# Patient Record
Sex: FEMALE | Race: WHITE | Employment: STUDENT | URBAN - METROPOLITAN AREA
[De-identification: names, ages, dates, MRNs, and addresses within clinical notes are randomized per-mention and may not be internally consistent; named-entity substitution may affect disease eponyms.]

---
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
## 2024-06-25 ENCOUNTER — LAB ENCOUNTER (OUTPATIENT)
Dept: LAB | Age: 19
End: 2024-06-25
Attending: FAMILY MEDICINE

## 2024-06-25 ENCOUNTER — OFFICE VISIT (OUTPATIENT)
Dept: FAMILY MEDICINE CLINIC | Facility: CLINIC | Age: 19
End: 2024-06-25

## 2024-06-25 VITALS
TEMPERATURE: 98 F | OXYGEN SATURATION: 98 % | RESPIRATION RATE: 16 BRPM | HEIGHT: 66.5 IN | HEART RATE: 80 BPM | SYSTOLIC BLOOD PRESSURE: 112 MMHG | DIASTOLIC BLOOD PRESSURE: 72 MMHG | BODY MASS INDEX: 29.86 KG/M2 | WEIGHT: 188 LBS

## 2024-06-25 DIAGNOSIS — E66.3 OVERWEIGHT (BMI 25.0-29.9): ICD-10-CM

## 2024-06-25 DIAGNOSIS — Z00.01 ENCOUNTER FOR GENERAL ADULT MEDICAL EXAMINATION WITH ABNORMAL FINDINGS: Primary | ICD-10-CM

## 2024-06-25 DIAGNOSIS — F41.1 GENERALIZED ANXIETY DISORDER: ICD-10-CM

## 2024-06-25 DIAGNOSIS — Z86.19 HISTORY OF MONONUCLEOSIS: ICD-10-CM

## 2024-06-25 DIAGNOSIS — Z00.00 LABORATORY EXAM ORDERED AS PART OF ROUTINE GENERAL MEDICAL EXAMINATION: ICD-10-CM

## 2024-06-25 LAB
ALBUMIN SERPL-MCNC: 3.7 G/DL (ref 3.4–5)
ALBUMIN/GLOB SERPL: 0.8 {RATIO} (ref 1–2)
ALP LIVER SERPL-CCNC: 55 U/L
ALT SERPL-CCNC: 30 U/L
ANION GAP SERPL CALC-SCNC: 9 MMOL/L (ref 0–18)
AST SERPL-CCNC: 18 U/L (ref 15–37)
BASOPHILS # BLD AUTO: 0.1 X10(3) UL (ref 0–0.2)
BASOPHILS NFR BLD AUTO: 1.2 %
BILIRUB SERPL-MCNC: 0.3 MG/DL (ref 0.1–2)
BUN BLD-MCNC: 5 MG/DL (ref 9–23)
CALCIUM BLD-MCNC: 8.9 MG/DL (ref 8.5–10.1)
CHLORIDE SERPL-SCNC: 108 MMOL/L (ref 98–112)
CHOLEST SERPL-MCNC: 228 MG/DL (ref ?–200)
CO2 SERPL-SCNC: 21 MMOL/L (ref 21–32)
CREAT BLD-MCNC: 0.73 MG/DL
EGFRCR SERPLBLD CKD-EPI 2021: 120 ML/MIN/1.73M2 (ref 60–?)
EOSINOPHIL # BLD AUTO: 0.3 X10(3) UL (ref 0–0.7)
EOSINOPHIL NFR BLD AUTO: 3.7 %
ERYTHROCYTE [DISTWIDTH] IN BLOOD BY AUTOMATED COUNT: 13.1 %
FASTING PATIENT LIPID ANSWER: YES
FASTING STATUS PATIENT QL REPORTED: YES
GLOBULIN PLAS-MCNC: 4.7 G/DL (ref 2.8–4.4)
GLUCOSE BLD-MCNC: 80 MG/DL (ref 70–99)
HCT VFR BLD AUTO: 38.2 %
HDLC SERPL-MCNC: 39 MG/DL (ref 40–59)
HGB BLD-MCNC: 12.8 G/DL
IMM GRANULOCYTES # BLD AUTO: 0.03 X10(3) UL (ref 0–1)
IMM GRANULOCYTES NFR BLD: 0.4 %
LDLC SERPL CALC-MCNC: 163 MG/DL (ref ?–100)
LYMPHOCYTES # BLD AUTO: 4.5 X10(3) UL (ref 1–4)
LYMPHOCYTES NFR BLD AUTO: 54.8 %
MCH RBC QN AUTO: 30 PG (ref 26–34)
MCHC RBC AUTO-ENTMCNC: 33.5 G/DL (ref 31–37)
MCV RBC AUTO: 89.5 FL
MONOCYTES # BLD AUTO: 0.57 X10(3) UL (ref 0.1–1)
MONOCYTES NFR BLD AUTO: 6.9 %
NEUTROPHILS # BLD AUTO: 2.71 X10 (3) UL (ref 1.5–7.7)
NEUTROPHILS # BLD AUTO: 2.71 X10(3) UL (ref 1.5–7.7)
NEUTROPHILS NFR BLD AUTO: 33 %
NONHDLC SERPL-MCNC: 189 MG/DL (ref ?–130)
OSMOLALITY SERPL CALC.SUM OF ELEC: 282 MOSM/KG (ref 275–295)
PLATELET # BLD AUTO: 416 10(3)UL (ref 150–450)
POTASSIUM SERPL-SCNC: 3.9 MMOL/L (ref 3.5–5.1)
PROT SERPL-MCNC: 8.4 G/DL (ref 6.4–8.2)
RBC # BLD AUTO: 4.27 X10(6)UL
SODIUM SERPL-SCNC: 138 MMOL/L (ref 136–145)
TRIGL SERPL-MCNC: 142 MG/DL (ref 30–149)
TSI SER-ACNC: 0.99 MIU/ML (ref 0.36–3.74)
VLDLC SERPL CALC-MCNC: 28 MG/DL (ref 0–30)
WBC # BLD AUTO: 8.2 X10(3) UL (ref 4–11)

## 2024-06-25 PROCEDURE — 3078F DIAST BP <80 MM HG: CPT | Performed by: FAMILY MEDICINE

## 2024-06-25 PROCEDURE — 80061 LIPID PANEL: CPT | Performed by: FAMILY MEDICINE

## 2024-06-25 PROCEDURE — 86665 EPSTEIN-BARR CAPSID VCA: CPT | Performed by: FAMILY MEDICINE

## 2024-06-25 PROCEDURE — 86664 EPSTEIN-BARR NUCLEAR ANTIGEN: CPT | Performed by: FAMILY MEDICINE

## 2024-06-25 PROCEDURE — 99203 OFFICE O/P NEW LOW 30 MIN: CPT | Performed by: FAMILY MEDICINE

## 2024-06-25 PROCEDURE — 3008F BODY MASS INDEX DOCD: CPT | Performed by: FAMILY MEDICINE

## 2024-06-25 PROCEDURE — 99385 PREV VISIT NEW AGE 18-39: CPT | Performed by: FAMILY MEDICINE

## 2024-06-25 PROCEDURE — 80050 GENERAL HEALTH PANEL: CPT | Performed by: FAMILY MEDICINE

## 2024-06-25 PROCEDURE — 3074F SYST BP LT 130 MM HG: CPT | Performed by: FAMILY MEDICINE

## 2024-06-25 RX ORDER — BUPROPION HYDROCHLORIDE 150 MG/1
150 TABLET ORAL DAILY
Qty: 30 TABLET | Refills: 1 | Status: SHIPPED | OUTPATIENT
Start: 2024-06-25

## 2024-06-25 NOTE — PROGRESS NOTES
Family Medicine Progress Note  Assessment & Plan:   Jennyfer Rodgers is a 19 year old female who is here for:     1. Encounter for general adult medical examination with abnormal findings  Wellness Exam done today and routine Preventative Care discussed as noted below.   -Pap smear: - plans to do with GYN   -Contraception:  OCP  -Immunizations:  look into HPV  -Routine labs ordered.   -Healthy eating habits and regular exercise encouraged   - CBC With Differential With Platelet; Future  - Comp Metabolic Panel (14); Future  - TSH W Reflex To Free T4; Future  - Lipid Panel; Future    2. Generalized anxiety disorder- CHRONIC issue that has not been actively managed but pt is at a point where she wants to manage this.  No si.hi.   - Discussed TX options- Counseling +/- RX therapy   - Rx for Wellbutrin--- more of an overwhelm pictute--- did warn this is off label and may be stimulating.    - Discussed onset for 6 weeks and common adverse effects (GI upset, decreased sexual drive)  - TIMI ref.  - Recommend adequate sleep, nutrition, and physical activity.    - Remove possible triggers of anxiety/panic if possible   - Return to clinic if develop worsening depression/anxiety, SI/HI, and intolerable GI or sexual side-effects. For severely worsening symptoms, patient is to report to nearest ER and/or seek immediate medical attention.   - Patient verbalized understanding and agreement with the plan.   - buPROPion  MG Oral Tablet 24 Hr; Take 1 tablet (150 mg total) by mouth daily.  Dispense: 30 tablet; Refill: 1  -  NAVIGATOR    3. History of mononucleosis-  recent mono and multiple other conditions.  Would like to check for IgM and when she is less contagious/safe of Dental Proc.   - EBV, Chronic/Active Infection [E]; Future    4. Overweight (BMI 25.0-29.9)  - Discussed BMI, routine labs ordered,  Brief discussion on healthy diet and importance of physical activity.               Follow-Up: Return in about 6 weeks  (around 2024) for F/U Mental Health.      Sabiha Madrigal, DO   24      CC: Establish Care    Subjective:    History of Present Illness:  History obtained from patient.     Jennyfer Rodgers is a 19 year old female who presents for Establish Care     ANNUAL PHYSICAL  Menses: Regular without any break through bleeding or concerning symptoms.   LMP: No LMP recorded. (Menstrual status: Continuous Pill).  Concern for STI: Denies   Contraception:  continuous OCP  Cervical Cancer Screening- Due, has GYN and plan to complete with her    Exercise: generally tries to be active  Healthy eating habits:  Well-rounded  Tobacco: denies   Immunizations: Not sure if she got HPV, will look into.     VERONICA/MDD-  Prev was on Sertraline for Depression, feels like she is in good place with her depression, but moreso struggling with Anxiety- stopped about 1-2 yrs.   Anxiety was worse with being in school, less of an issue with being out.   Doesn't like crowded placed, being around a lot of people.   No recent panic.    Counseling- in HS but not currently.   Trauma- none.   Working now---Nanny--- 18m twins; 3yom-- two diff families.    Sexually active- monogmaous relat.   No pior pap.  Does have GYN which she follows.   Diet- healthy   Exercise- occ walks, too hot to walk outside     -Nausea and Emesis and then nausea x 2d  -UTI- AbX gave yeast infection  -Mono  - back to normal    Inquiring why she keeps getting sick,     Pshx: none   All: seasonal   Fam hx:VERONICA-M, MGM, Heart/CVA-PGF;Ca-MGM    Soc hx: Sexually active- monog.  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 (Menstrual status: Continuous Pill).. last pap: -, last mammogram: -,  ,   Colonoscopy: -     EEH AMB VERONICA-7      Feeling nervous, anxious, or on edge 2     Not being able to stop or control worrying 2     Worrying too much about different things  2     Trouble relaxing 1     Being so restless that it's hard to sit  still 0     Becoming easily annoyed or irritable 1     Feeling afraid as if something awful might happen 2     VERONICA 7 Total Score 10     How difficult has it made it for you to do your work, take care of things at home, get along with others? N         Depression Screenings (All PHQ) 6/25     Little interest or pleasure in doing things 0     Feeling down, depressed, or hopeless 0     Trouble falling/staying asleep, or sleeping too much 1     Feeling tired or having little energy 2     Poor appetite or overeating 2     Feeling bad about yourself 1     Trouble concentrating  0     Moving or speaking slowly Or  fidgety/restless  0     Th you would be better off dead/harming yourself 0     PHQ-9 TOTAL SCORE 6     How difficult has it made it for you to do your work, take care of things at home, get along with others? n         History/Other:   ROS-Per HPI     Problem List:  Patient Active Problem List   Diagnosis    Social anxiety disorder    Generalized anxiety disorder       Current Medications:  Current Outpatient Medications   Medication Sig Dispense Refill    buPROPion  MG Oral Tablet 24 Hr Take 1 tablet (150 mg total) by mouth daily. 30 tablet 1    NORTREL 1/35, 28, 1-35 MG-MCG Oral Tab TK 1 T PO QD ACTIVE PILLS CONTINUOUSLY. SKIP PLACEBO EACH MONTH.        Past Medical History:  Past Medical History:    Allergic rhinitis    Anxiety    Depression    i have gone through depression but am not currently depressed      Past Surgical History:  History reviewed. No pertinent surgical history.   Family History:  Family History   Problem Relation Age of Onset    No Known Problems Father     Anxiety Mother     Depression Mother     Anxiety Maternal Grandmother     Depression Maternal Grandmother     Cancer Maternal Grandfather     Thyroid disease Paternal Grandmother     Heart Disease Paternal Grandfather     Stroke Paternal Grandfather     No Known Problems Sister     Stroke Sister     High Blood Pressure Maternal  Uncle       Social History:  Social History     Socioeconomic History    Marital status: Single   Tobacco Use    Smoking status: Never    Smokeless tobacco: Never   Vaping Use    Vaping status: Never Used   Substance and Sexual Activity    Alcohol use: Not Currently     Comment: 2x/month, 3-5 drinks of vodka per day.    Drug use: Yes     Frequency: 5.0 times per week     Types: Cannabis     Comment: 1-2x week, usually with friends, inhalation    Sexual activity: Never   Other Topics Concern    Caffeine Concern No    Exercise No    Seat Belt Yes    Special Diet No    Stress Concern Yes    Weight Concern Yes       Allergies:  Allergies   Allergen Reactions    Seasonal Coughing        Objective:    VITALS: /72   Pulse 80   Temp 97.8 °F (36.6 °C) (Temporal)   Resp 16   Ht 5' 6.5\" (1.689 m)   Wt 188 lb (85.3 kg)   SpO2 98%   BMI 29.89 kg/m²      BP Readings from Last 3 Encounters:   06/25/24 112/72   08/02/21 116/83   07/30/20 116/82 (72%, Z = 0.58 /  96%, Z = 1.75)*     *BP percentiles are based on the 2017 AAP Clinical Practice Guideline for girls     Wt Readings from Last 3 Encounters:   06/25/24 188 lb (85.3 kg)   08/02/21 215 lb (97.5 kg) (98%, Z= 2.15)*   07/30/20 164 lb 9.6 oz (74.7 kg) (92%, Z= 1.42)*     * Growth percentiles are based on [facility] (Girls, 2-20 Years) data.     PHYSICAL EXAM  GEN: pleasant, well-appearing in NAD, AOX3  SKIN: no visible rashes, lesions, or evidence of trauma  HEENT: PERRL, EOMI, moist mucous membranes, oropharynx clear, TM clear, nares patent, no Thyromegaly or nodule.   CV: RRR, no murmurs or abnl heart sounds   PULM: Clear to auscultation, No wheezes, rales, rhonchi.  Non-labored breathing.  ABD: Soft, non-tender, non-distended, + BS, no rigidity/guarding  EXT:  Warm, well perfused, no lower extremity edema  NEURO: CNs grossly intact, no focal weakness  MSK: moves all 4 extremities without difficulty  PSYCH: mood and affect are appropriate                Sabiha Madrigal  DO    NOTE TO PATIENT: The 21st Century Cures Act makes clinical notes like these available to patients in the interest of transparency. Clinical notes are medical documents used by physicians and care providers to communicate with each other. These documents include medical language and terminology, abbreviations, and treatment information that may sound technical and at times possibly unfamiliar. In addition, at times, the verbiage may appear blunt or direct. These documents are one tool providers use to communicate relevant information and clinical opinions of the care providers in a way that allows common understanding of the clinical context.

## 2024-06-25 NOTE — PATIENT INSTRUCTIONS
.  Treating Anxiety Disorders with Therapy  If you have an anxiety disorder, you should know it can be treated. Therapy (also called counseling) is often a helpful treatment for anxiety disorders. With therapy, a trained therapist helps you face and learn to manage your anxiety. Therapy can be short-term or long-term. It is based on your needs. In some cases, medicine may also be prescribed with therapy. It may take time before you notice how much therapy is helping. But stick with it. With therapy, you can feel better.  Cognitive behavioral therapy (CBT)  Cognitive behavioral therapy (CBT) teaches you to manage anxiety. It does this by helping you understand how you think and act when you’re anxious. Research has shown CBT to work very well for anxiety disorders. CBT includes homework and activities. These build your skills to cope with anxiety step by step. CBT can be done in a group or one-on-one. It often takes place for a set number of sessions. CBT has two main parts:  Cognitive therapy. This helps you identify the negative, irrational thoughts that occur with your anxiety. You’ll learn to replace these with more positive, realistic thoughts.  Behavioral therapy. This helps you change how you react to anxiety. You’ll learn coping skills and methods for relaxing to help you better deal with anxiety.    MENTAL HEALTH APPS-   [employer] CBT- Free Anxiety Focused--each you how to harness CBT to lower anxiety levels. It allows you to challenge the personal beliefs that may be holding you back from more peaceful living, and gives you clear tools to improve your overall wellbeing.    MoodKit $5.00- teaches concepts of Cognitive Behavioral Therapy    CBT Thought Diary- record and identify maladaptive thoughts.     HeadSpace- Great for Meditation can alleviate heightened anxiety, and Headspace aims to take the guesswork out of learning how to meditate, and will help you with a regular meditation practice.    Happify-  Happify is geared toward helping people lower stress and manage anxiety--all by helping you let go of habits that aren’t working for you and creating new ones

## 2024-06-26 LAB
EBV NA IGG SER QL IA: NEGATIVE
EBV VCA IGG SER QL IA: POSITIVE
EBV VCA IGM SER QL IA: POSITIVE

## 2024-06-27 ENCOUNTER — TELEPHONE (OUTPATIENT)
Age: 19
End: 2024-06-27

## 2024-06-27 PROBLEM — R52 PAIN, UNSPECIFIED: Status: RESOLVED | Noted: 2021-03-08 | Resolved: 2024-06-27

## 2024-06-27 PROBLEM — F33.1 MODERATE EPISODE OF RECURRENT MAJOR DEPRESSIVE DISORDER (HCC): Status: RESOLVED | Noted: 2021-03-08 | Resolved: 2024-06-27

## 2024-06-27 PROBLEM — R10.33 PERIUMBILICAL ABDOMINAL PAIN: Status: RESOLVED | Noted: 2020-03-10 | Resolved: 2024-06-27

## 2024-06-27 PROBLEM — R11.2 NON-INTRACTABLE VOMITING WITH NAUSEA, UNSPECIFIED VOMITING TYPE: Status: RESOLVED | Noted: 2019-08-21 | Resolved: 2024-06-27

## 2024-06-27 PROBLEM — R55 SYNCOPE, NEAR: Status: RESOLVED | Noted: 2018-05-07 | Resolved: 2024-06-27

## 2024-06-27 PROBLEM — R63.5 WEIGHT GAIN: Status: RESOLVED | Noted: 2020-12-29 | Resolved: 2024-06-27

## 2024-06-27 PROBLEM — B34.9 VIRAL SYNDROME: Status: RESOLVED | Noted: 2020-02-26 | Resolved: 2024-06-27

## 2024-06-27 PROBLEM — E78.00 PURE HYPERCHOLESTEROLEMIA: Status: ACTIVE | Noted: 2024-06-27

## 2024-07-01 ENCOUNTER — PATIENT MESSAGE (OUTPATIENT)
Dept: FAMILY MEDICINE CLINIC | Facility: CLINIC | Age: 19
End: 2024-07-01

## 2024-07-02 NOTE — TELEPHONE ENCOUNTER
From: Jennyfer Rodgers  To: Sabiha Madrigal  Sent: 7/1/2024 3:55 PM CDT  Subject: abdominal pain    for the past few days i’ve had dull pain on and off on my lower left side and it’s starting to worry me and i just want to check to see if it’s something i should be concerned about

## 2024-08-06 ENCOUNTER — OFFICE VISIT (OUTPATIENT)
Dept: FAMILY MEDICINE CLINIC | Facility: CLINIC | Age: 19
End: 2024-08-06
Payer: COMMERCIAL

## 2024-08-06 VITALS
HEIGHT: 66 IN | OXYGEN SATURATION: 99 % | DIASTOLIC BLOOD PRESSURE: 86 MMHG | SYSTOLIC BLOOD PRESSURE: 110 MMHG | BODY MASS INDEX: 28.45 KG/M2 | RESPIRATION RATE: 14 BRPM | HEART RATE: 90 BPM | WEIGHT: 177 LBS | TEMPERATURE: 98 F

## 2024-08-06 DIAGNOSIS — R10.84 GENERALIZED ABDOMINAL PAIN: ICD-10-CM

## 2024-08-06 DIAGNOSIS — R10.2 PELVIC PAIN: Primary | ICD-10-CM

## 2024-08-06 DIAGNOSIS — Z11.3 SCREEN FOR SEXUALLY TRANSMITTED DISEASES: ICD-10-CM

## 2024-08-06 DIAGNOSIS — F41.1 GENERALIZED ANXIETY DISORDER: ICD-10-CM

## 2024-08-06 DIAGNOSIS — Z12.4 ENCOUNTER FOR SCREENING FOR CERVICAL CANCER: ICD-10-CM

## 2024-08-06 PROCEDURE — 87591 N.GONORRHOEAE DNA AMP PROB: CPT | Performed by: FAMILY MEDICINE

## 2024-08-06 PROCEDURE — 81514 NFCT DS BV&VAGINITIS DNA ALG: CPT | Performed by: FAMILY MEDICINE

## 2024-08-06 PROCEDURE — 99215 OFFICE O/P EST HI 40 MIN: CPT | Performed by: FAMILY MEDICINE

## 2024-08-06 PROCEDURE — 3008F BODY MASS INDEX DOCD: CPT | Performed by: FAMILY MEDICINE

## 2024-08-06 PROCEDURE — 3079F DIAST BP 80-89 MM HG: CPT | Performed by: FAMILY MEDICINE

## 2024-08-06 PROCEDURE — 87491 CHLMYD TRACH DNA AMP PROBE: CPT | Performed by: FAMILY MEDICINE

## 2024-08-06 PROCEDURE — 3074F SYST BP LT 130 MM HG: CPT | Performed by: FAMILY MEDICINE

## 2024-08-06 RX ORDER — BUPROPION HYDROCHLORIDE 150 MG/1
150 TABLET ORAL DAILY
Qty: 90 TABLET | Refills: 0 | Status: SHIPPED | OUTPATIENT
Start: 2024-08-06

## 2024-08-06 RX ORDER — PANTOPRAZOLE SODIUM 20 MG/1
20 TABLET, DELAYED RELEASE ORAL
Qty: 30 TABLET | Refills: 1 | Status: SHIPPED | OUTPATIENT
Start: 2024-08-06

## 2024-08-06 NOTE — PROGRESS NOTES
Family Medicine Progress Note  Assessment & Plan:   Jennyfer Rodgers is a 19 year old female who is here for:     1. Pelvic pain- lower abd pain, which seems more pelvic in nature as it was reproduced on exam.   - Vaginitis panel.   - TVUS.   - Monitor for worsening/improving symptoms.    - Vaginitis Vaginosis PCR Panel; Future  - US PELVIS EV W DOPPLER (CPT=76856/95960/21237); Future  - Vaginitis Vaginosis PCR Panel    2. Generalized abdominal pain- chronic, nonspeicfic.  Bowels seems to alternate bw loose vs constiaption.  Also with nausea and pain in the AM -- curious if this is more acidic/reflux driven.   - Increase exercise and water intake(2L/day);  Goal fiber 25-35g/day  - probiotics,   - Triial of PPI to see if this offers improvement.     3. Encounter for screening for cervical cancer  - ThinPrep Pap with HPV Reflex, Chlamydia/GC; Future  - ThinPrep Pap with HPV Reflex, Chlamydia/GC    4. Screen for sexually transmitted diseases  - Chlamydia/Gc Amplification; Future  - Chlamydia/Gc Amplification    5. Generalized anxiety disorder  Patient describes mood as Euthymic.  Meds controlling symptoms based on GAD7.   -Contin Wellbutrin 150mg   -CBT/Counseling: none --- TIMI ref in the past.    -Aware of importance of adequate sleep, nutrition, and physical activity.   -Aware of ER Precautions for SI/HI.  Ant to Return to clinic if develop worsening symptoms or adverse effects from RX therapy.    - buPROPion  MG Oral Tablet 24 Hr; Take 1 tablet (150 mg total) by mouth daily.  Dispense: 90 tablet; Refill: 0     Follow-Up: Return for pending work-up for GYN and  for Mental health .      Sabiha Madrigal DO   08/06/24      CC: Medication Follow-Up (Wellbutrin and discuss chronic stomach issues)    Subjective:    History of Present Illness:  History obtained from patient.     Jennyfer Rodgers is a 19 year old female who presents for Medication Follow-Up (Wellbutrin and discuss chronic stomach issues)        VERONICA/MDD-  Prev was on Sertraline for Depression, feels like she is in good place with her depression, but moreso struggling with Anxiety- stopped about 1-2 yrs.   Anxiety was worse with being in school, less of an issue with being out.  Doesn't like crowded placed, being around a lot of people.  No recent panic.    Counseling- in HS but not currently.   Trauma- none.   Working now------ 18m twins; 3yom-- two diff families.    Sexually active- monogmaous relat.   Diet- healthy   Exercise- occ walks, too hot to walk outside   24- Has been on Butrans for about 6 weeks, notable improvement in her mood and ability to focus.  May be with some initial irritability but that had resolved after couple weeks.  Feels overall she is doing good from a mental health standpoint.  Would like to continue on this current dose.    ABD- Stomach is sensitive to foods;  Will wake up and feel nauseous and then will last along the day,   BM- usually multiple times per day; but then sometimes will have constipation.  Generally thought she may have IBS. No blood   Lower ABD dull ache that switches sides.  Couple weeks, not all day perse or everyday, but the pain does keep recurring.   Nothing necessarily worsens.  Nothing makes it better.   Continuous OCP so no menses.   No concern for sti.   No vaginal discharge, no dyspareunia.    Does get worse with constipation.   No pain with urination.    No prior GI---      Pshx: none   All: seasonal   Fam hx:VERONICA-M, MGM, Heart/CVA-PGF;Ca-MGM    Soc hx: Sexually active- monog.   2 families Twins and thenn tabby   Ob/gyne: No LMP recorded. (Menstrual status: Continuous Pill).. last pap: -, last mammogram: -,  ,   Colonoscopy: -     EEH AMB VERONICA-7  8    Feeling nervous, anxious, or on edge 2 1    Not being able to stop or control worrying 2 0    Worrying too much about different things  2 1    Trouble relaxing 1 0    Being so restless that it's hard to sit still 0 0     Becoming easily annoyed or irritable 1 0    Feeling afraid as if something awful might happen 2 0    VERONICA 7 Total Score 10 02    How difficult has it made it for you to do your work, take care of things at home, get along with others? N N        Depression Screenings (All PHQ) 6/25 8/6    Little interest or pleasure in doing things 0 0    Feeling down, depressed, or hopeless 0 0    Trouble falling/staying asleep, or sleeping too much 1 0    Feeling tired or having little energy 2 0    Poor appetite or overeating 2 0    Feeling bad about yourself 1 0    Trouble concentrating  0 0    Moving or speaking slowly Or  fidgety/restless  0 0    Th you would be better off dead/harming yourself 0 0    PHQ-9 TOTAL SCORE 6 0    How difficult has it made it for you to do your work, take care of things at home, get along with others? n         History/Other:   ROS-Per HPI     Problem List:  Patient Active Problem List   Diagnosis    Social anxiety disorder    Generalized anxiety disorder    Pure hypercholesterolemia       Current Medications:  Current Outpatient Medications   Medication Sig Dispense Refill    pantoprazole 20 MG Oral Tab EC Take 1 tablet (20 mg total) by mouth every morning before breakfast. 30 tablet 1    buPROPion  MG Oral Tablet 24 Hr Take 1 tablet (150 mg total) by mouth daily. 90 tablet 0    NORTREL 1/35, 28, 1-35 MG-MCG Oral Tab TK 1 T PO QD ACTIVE PILLS CONTINUOUSLY. SKIP PLACEBO EACH MONTH.        Past Medical History:  Past Medical History:    Allergic rhinitis    Anxiety    Depression    i have gone through depression but am not currently depressed      Past Surgical History:  History reviewed. No pertinent surgical history.   Family History:  Family History   Problem Relation Age of Onset    No Known Problems Father     Anxiety Mother     Depression Mother     Anxiety Maternal Grandmother     Depression Maternal Grandmother     Cancer Maternal Grandfather     Thyroid disease Paternal Grandmother      Heart Disease Paternal Grandfather     Stroke Paternal Grandfather     No Known Problems Sister     Stroke Sister     High Blood Pressure Maternal Uncle       Social History:  Social History     Socioeconomic History    Marital status: Single   Tobacco Use    Smoking status: Never    Smokeless tobacco: Never   Vaping Use    Vaping status: Never Used   Substance and Sexual Activity    Alcohol use: Not Currently     Comment: 2x/month, 3-5 drinks of vodka per day.    Drug use: Yes     Frequency: 5.0 times per week     Types: Cannabis     Comment: 1-2x week, usually with friends, inhalation    Sexual activity: Never   Other Topics Concern    Caffeine Concern No    Exercise No    Seat Belt Yes    Special Diet No    Stress Concern Yes    Weight Concern Yes       Allergies:  Allergies   Allergen Reactions    Seasonal Coughing        Objective:    VITALS: /86   Pulse 90   Temp 97.5 °F (36.4 °C) (Temporal)   Resp 14   Ht 5' 6\" (1.676 m)   Wt 177 lb (80.3 kg)   SpO2 99%   BMI 28.57 kg/m²      BP Readings from Last 3 Encounters:   08/06/24 110/86   06/25/24 112/72   08/02/21 116/83     Wt Readings from Last 3 Encounters:   08/06/24 177 lb (80.3 kg)   06/25/24 188 lb (85.3 kg)   08/02/21 215 lb (97.5 kg) (98%, Z= 2.15)*     * Growth percentiles are based on CDC (Girls, 2-20 Years) data.     PHYSICAL EXAM  GEN: pleasant, well-appearing in NAD  SKIN: no visible rashes, lesions, or evidence of trauma  HEENT:  no conjunctivitis or scleral icterus; mmm  PULM: breathing comfortably on room air  MSK: moving all extremities well, no weakness or joint tenderness  NEURO: CNs grossly intact, no focal weakness, alert and oriented   ABD:  Soft, no notable tenderness, achey in the lower quad, not guarding or rigidity.     PELVIC EXAM: Chaperone offered and declined.   Vulva: no masses, tenderness or lesions,   Vagina: normal mucosa and rugae, no lesions  Cervix: thick discharge, more friable with obtaining sample.   Uterus:  normal size, shape and nontender  Adnexa: tender with palpation.     Approximately 45 minutes was spent: preparing to see the patient (reviewing prior tests, office notes, and consultant notes), personally obtaining a history, conducting a physical exam, counseling the patient on the plan of care, entering appropriate orders, and documenting clinical information in the electronic health record.         Sabiha Madrigal, DO    NOTE TO PATIENT: The 21st Century Cures Act makes clinical notes like these available to patients in the interest of transparency. Clinical notes are medical documents used by physicians and care providers to communicate with each other. These documents include medical language and terminology, abbreviations, and treatment information that may sound technical and at times possibly unfamiliar. In addition, at times, the verbiage may appear blunt or direct. These documents are one tool providers use to communicate relevant information and clinical opinions of the care providers in a way that allows common understanding of the clinical context.

## 2024-08-07 LAB
BV BACTERIA DNA VAG QL NAA+PROBE: NEGATIVE
C GLABRATA DNA VAG QL NAA+PROBE: NEGATIVE
C KRUSEI DNA VAG QL NAA+PROBE: NEGATIVE
C TRACH DNA SPEC QL NAA+PROBE: NEGATIVE
CANDIDA DNA VAG QL NAA+PROBE: NEGATIVE
N GONORRHOEA DNA SPEC QL NAA+PROBE: NEGATIVE
T VAGINALIS DNA VAG QL NAA+PROBE: NEGATIVE

## 2024-08-12 LAB
.: NORMAL
.: NORMAL

## 2024-08-30 ENCOUNTER — HOSPITAL ENCOUNTER (OUTPATIENT)
Dept: ULTRASOUND IMAGING | Age: 19
Discharge: HOME | End: 2024-08-30
Attending: FAMILY MEDICINE
Payer: COMMERCIAL

## 2024-08-30 DIAGNOSIS — R10.2 PELVIC PAIN: ICD-10-CM

## 2024-08-30 PROCEDURE — 76830 TRANSVAGINAL US NON-OB: CPT | Performed by: FAMILY MEDICINE

## 2024-08-30 PROCEDURE — 76856 US EXAM PELVIC COMPLETE: CPT | Performed by: FAMILY MEDICINE

## 2024-09-02 ENCOUNTER — PATIENT MESSAGE (OUTPATIENT)
Dept: FAMILY MEDICINE CLINIC | Facility: CLINIC | Age: 19
End: 2024-09-02

## 2024-09-09 ENCOUNTER — E-VISIT (OUTPATIENT)
Dept: TELEHEALTH | Age: 19
End: 2024-09-09
Payer: COMMERCIAL

## 2024-09-09 DIAGNOSIS — R39.9 UTI SYMPTOMS: Primary | ICD-10-CM

## 2024-09-12 NOTE — PROGRESS NOTES
Pt responded, sent follow up questions regarding ordering U/A and Urine cx vs in person visit. No patient response.  5 minutes spent with patient.    Total time: 10 minutes between two providers.

## 2024-10-14 RX ORDER — PANTOPRAZOLE SODIUM 20 MG/1
20 TABLET, DELAYED RELEASE ORAL
Qty: 90 TABLET | Refills: 1 | Status: SHIPPED | OUTPATIENT
Start: 2024-10-14

## 2024-10-14 NOTE — TELEPHONE ENCOUNTER
Please review:    Trial of PPI noted in office visit notes from 8/6/24. Please advise if refill is appropriate or if a medication follow up appointment is needed.    No future appointments with family medicine/internal medicine.  Last office visit: 8/6/2024    Requested Prescriptions   Pending Prescriptions Disp Refills    PANTOPRAZOLE 20 MG Oral Tab EC [Pharmacy Med Name: PANTOPRAZOLE SOD DR 20 MG TAB] 30 tablet 1     Sig: Take 1 tablet (20 mg total) by mouth every morning before breakfast.       Gastrointestional Medication Protocol Passed - 10/14/2024 12:10 PM        Passed - In person appointment or virtual visit in the past 12 mos or appointment in next 3 mos     Recent Outpatient Visits              1 month ago UTI symptoms    [facility], Virtual Visit Alice Haley PA-C    E-Visit    2 months ago Pelvic pain    [facility], [address] [place] [address] Sabiha Fraser DO    Office Visit    3 months ago Encounter for general adult medical examination with abnormal findings    [facility], [address] [place] [address] Sabiha Fraser DO    Office Visit    3 years ago Encounter for routine child health examination without abnormal findings    Pediatrics - [address] [place] [address] Agustina Bills MD    Office Visit    3 years ago Social anxiety disorder    [facility], [address]juana Street DO    Telemedicine                             Recent Outpatient Visits              1 month ago UTI symptoms    [facility], Virtual Visit Alice Haley PA-C    E-Visit    2 months ago Pelvic pain    [facility], [address] [place] [address] Sabiha Fraser DO    Office Visit    3 months ago Encounter for general adult medical examination with abnormal findings    [facility], [address] [place] [address] Sabiha Fraser DO    Office Visit    3 years ago Encounter for routine child health  examination without abnormal findings    Pediatrics - [facility], Agustina Armstrong MD    Office Visit    3 years ago Social anxiety disorder    [facility] - [facility] Amin, Sanjuana Street DO    Telemedicine

## 2024-10-20 ENCOUNTER — TELEPHONE (OUTPATIENT)
Dept: FAMILY MEDICINE CLINIC | Facility: CLINIC | Age: 19
End: 2024-10-20

## 2024-10-20 ENCOUNTER — OFFICE VISIT (OUTPATIENT)
Dept: FAMILY MEDICINE CLINIC | Facility: CLINIC | Age: 19
End: 2024-10-20
Payer: COMMERCIAL

## 2024-10-20 VITALS
RESPIRATION RATE: 18 BRPM | DIASTOLIC BLOOD PRESSURE: 70 MMHG | WEIGHT: 180 LBS | SYSTOLIC BLOOD PRESSURE: 120 MMHG | HEART RATE: 84 BPM | BODY MASS INDEX: 28.93 KG/M2 | HEIGHT: 66 IN | TEMPERATURE: 99 F | OXYGEN SATURATION: 99 %

## 2024-10-20 DIAGNOSIS — R30.0 DYSURIA: Primary | ICD-10-CM

## 2024-10-20 DIAGNOSIS — R82.90 ABNORMAL URINALYSIS: ICD-10-CM

## 2024-10-20 DIAGNOSIS — N90.89 VULVAR IRRITATION: ICD-10-CM

## 2024-10-20 LAB
APPEARANCE: CLEAR
BILIRUBIN: NEGATIVE
GLUCOSE (URINE DIPSTICK): NEGATIVE MG/DL
KETONES (URINE DIPSTICK): NEGATIVE MG/DL
MULTISTIX LOT#: ABNORMAL NUMERIC
NITRITE, URINE: NEGATIVE
PH, URINE: 7 (ref 4.5–8)
PROTEIN (URINE DIPSTICK): NEGATIVE MG/DL
SPECIFIC GRAVITY: 1.02 (ref 1–1.03)
URINE-COLOR: YELLOW
UROBILINOGEN,SEMI-QN: 0.2 MG/DL (ref 0–1.9)

## 2024-10-20 PROCEDURE — 81514 NFCT DS BV&VAGINITIS DNA ALG: CPT | Performed by: NURSE PRACTITIONER

## 2024-10-20 PROCEDURE — 87086 URINE CULTURE/COLONY COUNT: CPT | Performed by: NURSE PRACTITIONER

## 2024-10-20 RX ORDER — NITROFURANTOIN 25; 75 MG/1; MG/1
100 CAPSULE ORAL 2 TIMES DAILY
Qty: 10 CAPSULE | Refills: 0 | Status: SHIPPED | OUTPATIENT
Start: 2024-10-20 | End: 2024-10-25

## 2024-10-20 NOTE — PROGRESS NOTES
Judy Chapman, MSN, APRN, FNP-BC    HPI   Jennyfer Rodgers is a 19 year old female who presents to the clinic for Urinary Symptoms (Burning,itching x2days)    Patient has dysuria symptoms for 2 days.  Patient reports some burning with urination and vulvar irritation and itching.  Denies changes with vaginal discharge, denies vaginal odor.  Patient reports having urinary tract infections before and this feels slightly different.  Denies hematuria, flank pain, nausea, vomiting, fever, and chills. Denies abnormal vaginal bleeding and pelvic pain. On continue OCPs.     She is sexually active with 1 male partner, not new. Uses OCPs, no condoms, for contraception.  Declines STI testing today.     MEDICATIONS     Current Outpatient Medications   Medication Sig Dispense Refill    nitrofurantoin monohydrate macro 100 MG Oral Cap Take 1 capsule (100 mg total) by mouth 2 (two) times daily for 5 days. 10 capsule 0    buPROPion  MG Oral Tablet 24 Hr Take 1 tablet (150 mg total) by mouth daily. 90 tablet 0    NORTREL 1/35, 28, 1-35 MG-MCG Oral Tab TK 1 T PO QD ACTIVE PILLS CONTINUOUSLY. SKIP PLACEBO EACH MONTH.         ALLERGIES   Allergies[1]    HISTORY     OB History    Para Term  AB Living   0 0 0 0 0 0   SAB IAB Ectopic Multiple Live Births   0 0 0 0 0       Past Medical History:    Allergic rhinitis    Anxiety    Depression    i have gone through depression but am not currently depressed       History reviewed. No pertinent surgical history.    Family History   Problem Relation Age of Onset    No Known Problems Father     Anxiety Mother     Depression Mother     Anxiety Maternal Grandmother     Depression Maternal Grandmother     Cancer Maternal Grandfather     Thyroid disease Paternal Grandmother     Heart Disease Paternal Grandfather     Stroke Paternal Grandfather     No Known Problems Sister     Stroke Sister     High Blood Pressure Maternal Uncle        Social History     Socioeconomic History     Marital status: Single     Spouse name: Not on file    Number of children: Not on file    Years of education: Not on file    Highest education level: Not on file   Occupational History    Not on file   Tobacco Use    Smoking status: Never    Smokeless tobacco: Never   Vaping Use    Vaping status: Never Used   Substance and Sexual Activity    Alcohol use: Not Currently     Comment: 2x/month, 3-5 drinks of vodka per day.    Drug use: Yes     Frequency: 5.0 times per week     Types: Cannabis     Comment: 1-2x week, usually with friends, inhalation    Sexual activity: Never   Other Topics Concern    Caffeine Concern No    Exercise No    Seat Belt Yes    Special Diet No    Stress Concern Yes    Weight Concern Yes     Service Not Asked    Blood Transfusions Not Asked    Occupational Exposure Not Asked    Hobby Hazards Not Asked    Sleep Concern Not Asked    Back Care Not Asked    Bike Helmet Not Asked    Self-Exams Not Asked   Social History Narrative    Not on file     Social Drivers of Health     Financial Resource Strain: Not on file   Food Insecurity: Not on file   Transportation Needs: Not on file   Physical Activity: Not on file   Stress: Not on file   Social Connections: Not on file   Housing Stability: Not on file       ROS   Review of Systems   Constitutional:  Negative for chills and fever.   Gastrointestinal:  Negative for diarrhea, nausea and vomiting.   Genitourinary:  Positive for dysuria. Negative for decreased urine volume, difficulty urinating, dyspareunia, flank pain, frequency, genital sores, hematuria, menstrual problem, pelvic pain, urgency, vaginal bleeding, vaginal discharge and vaginal pain.        Vulvar/vaginal irritation   Skin:  Negative for rash.   Neurological:  Negative for headaches.   All other systems reviewed and are negative.       PHYSICAL EXAM   /70   Pulse 84   Temp 98.5 °F (36.9 °C) (Temporal)   Resp 18   Ht 5' 6\" (1.676 m)   Wt 180 lb (81.6 kg)   SpO2 99%   BMI  29.05 kg/m²     Physical Exam  Constitutional:       Appearance: Normal appearance. She is normal weight. She is not toxic-appearing or diaphoretic.   Genitourinary:      Urethral meatus normal.      No lesions in the vagina.      Right Labia: No rash, tenderness, lesions, skin changes or Bartholin's cyst.     Left Labia: No tenderness, lesions, skin changes, Bartholin's cyst or rash.     No vaginal discharge.   HENT:      Nose: Nose normal.      Mouth/Throat:      Pharynx: Oropharynx is clear.   Eyes:      General: No scleral icterus.  Cardiovascular:      Rate and Rhythm: Normal rate and regular rhythm.   Pulmonary:      Effort: Pulmonary effort is normal.   Abdominal:      Tenderness: There is no right CVA tenderness or left CVA tenderness.   Neurological:      Mental Status: She is alert and oriented to person, place, and time.   Skin:     General: Skin is warm and dry.      Findings: No rash.   Psychiatric:         Mood and Affect: Mood normal.         Behavior: Behavior normal.   Vitals and nursing note reviewed.           ASSESSMENT       ICD-10-CM    1. Dysuria  R30.0 Urine Dip, auto without Micro     Urine Culture, Routine [E]     Urine Culture, Routine [E]     nitrofurantoin monohydrate macro 100 MG Oral Cap      2. Vulvar irritation  N90.89 Vaginitis Vaginosis PCR Panel     Vaginitis Vaginosis PCR Panel      3. Abnormal urinalysis  R82.90           PLAN   - Urinalysis is abnormal, will treat with Macrobid while urine culture is pending  - Vaginosis panel collected  - Will follow with results and recommendations  - Pt verbalized understanding and questions answered  - Follow up with PCP or OBGYN if symptoms persist  - If hematuria, flank pain, nausea, vomiting, fever and/or chills occur, report to the nearest emergency room for prompt evaluation    ORDERS     Orders Placed This Encounter   Procedures    Urine Dip, auto without Micro    Urine Culture, Routine [E]    Vaginitis Vaginosis PCR Panel        PRESCRIPTIONS     Requested Prescriptions     Signed Prescriptions Disp Refills    nitrofurantoin monohydrate macro 100 MG Oral Cap 10 capsule 0     Sig: Take 1 capsule (100 mg total) by mouth 2 (two) times daily for 5 days.       IMAGING/ REFERRALS   None     BHANU Hernandez  10/20/2024  10:49 AM               [1]   Allergies  Allergen Reactions    Seasonal Coughing

## 2024-10-21 LAB
BV BACTERIA DNA VAG QL NAA+PROBE: NEGATIVE
C GLABRATA DNA VAG QL NAA+PROBE: NEGATIVE
C KRUSEI DNA VAG QL NAA+PROBE: NEGATIVE
CANDIDA DNA VAG QL NAA+PROBE: NEGATIVE
T VAGINALIS DNA VAG QL NAA+PROBE: NEGATIVE

## 2024-10-23 ENCOUNTER — TELEPHONE (OUTPATIENT)
Dept: FAMILY MEDICINE CLINIC | Facility: CLINIC | Age: 19
End: 2024-10-23

## 2024-10-23 NOTE — TELEPHONE ENCOUNTER
Triage call transferred.   Spoke with pt stating seen @[facility] 10/20/24 for burning upon urination and vulvar irritation and itching. UCx negative and vaginitis panel also negative. When pt swabbed for vaginitis panel had slight bleeding after- no reoccurrences.   Pt given nitrofurantoin monohydrate macro 100 MG BID x5 days. Pt has 3 doses left and is still experiencing burning with urination. No c/o any other sx at this time.  Informed may need to be seen. Will relay to PCP for further recommendations.  Pt verbalized understanding and agreed with POC.     Please advise. Thank you.

## 2024-10-24 ENCOUNTER — OFFICE VISIT (OUTPATIENT)
Dept: FAMILY MEDICINE CLINIC | Facility: CLINIC | Age: 19
End: 2024-10-24
Payer: COMMERCIAL

## 2024-10-24 VITALS
TEMPERATURE: 97 F | HEART RATE: 94 BPM | BODY MASS INDEX: 29 KG/M2 | OXYGEN SATURATION: 98 % | SYSTOLIC BLOOD PRESSURE: 102 MMHG | HEIGHT: 66 IN | RESPIRATION RATE: 16 BRPM | DIASTOLIC BLOOD PRESSURE: 76 MMHG

## 2024-10-24 DIAGNOSIS — Z11.3 SCREEN FOR SEXUALLY TRANSMITTED DISEASES: ICD-10-CM

## 2024-10-24 DIAGNOSIS — R23.8 VESICULAR LESION: ICD-10-CM

## 2024-10-24 DIAGNOSIS — N90.89 VULVAR IRRITATION: Primary | ICD-10-CM

## 2024-10-24 DIAGNOSIS — Z23 NEED FOR VACCINATION: ICD-10-CM

## 2024-10-24 LAB
APPEARANCE: CLEAR
BILIRUBIN: NEGATIVE
GLUCOSE (URINE DIPSTICK): NEGATIVE MG/DL
MULTISTIX LOT#: ABNORMAL NUMERIC
NITRITE, URINE: NEGATIVE
PH, URINE: 7 (ref 4.5–8)
SPECIFIC GRAVITY: 1.02 (ref 1–1.03)
URINE-COLOR: YELLOW
UROBILINOGEN,SEMI-QN: 0.2 MG/DL (ref 0–1.9)

## 2024-10-24 PROCEDURE — 3078F DIAST BP <80 MM HG: CPT | Performed by: FAMILY MEDICINE

## 2024-10-24 PROCEDURE — 87591 N.GONORRHOEAE DNA AMP PROB: CPT | Performed by: FAMILY MEDICINE

## 2024-10-24 PROCEDURE — 87491 CHLMYD TRACH DNA AMP PROBE: CPT | Performed by: FAMILY MEDICINE

## 2024-10-24 PROCEDURE — 81003 URINALYSIS AUTO W/O SCOPE: CPT | Performed by: FAMILY MEDICINE

## 2024-10-24 PROCEDURE — 3074F SYST BP LT 130 MM HG: CPT | Performed by: FAMILY MEDICINE

## 2024-10-24 PROCEDURE — 87529 HSV DNA AMP PROBE: CPT | Performed by: FAMILY MEDICINE

## 2024-10-24 PROCEDURE — 90472 IMMUNIZATION ADMIN EACH ADD: CPT | Performed by: FAMILY MEDICINE

## 2024-10-24 PROCEDURE — 90656 IIV3 VACC NO PRSV 0.5 ML IM: CPT | Performed by: FAMILY MEDICINE

## 2024-10-24 PROCEDURE — 90471 IMMUNIZATION ADMIN: CPT | Performed by: FAMILY MEDICINE

## 2024-10-24 PROCEDURE — 99214 OFFICE O/P EST MOD 30 MIN: CPT | Performed by: FAMILY MEDICINE

## 2024-10-24 PROCEDURE — 90715 TDAP VACCINE 7 YRS/> IM: CPT | Performed by: FAMILY MEDICINE

## 2024-10-24 RX ORDER — VALACYCLOVIR HYDROCHLORIDE 1 G/1
1000 TABLET, FILM COATED ORAL EVERY 12 HOURS SCHEDULED
Qty: 14 TABLET | Refills: 0 | Status: SHIPPED | OUTPATIENT
Start: 2024-10-24 | End: 2024-10-25

## 2024-10-24 NOTE — PROGRESS NOTES
Family Medicine Progress Note  Assessment & Plan:   Jennyfer Rodgers is a 19 year old female who is here for:     1. Vulvar irritation  2. Vesicular lesion- pt with dysuria, vulvar irritation, and vesicular lesions.  Appears most CW HSV.  HSV obtained.   - RX for Valtrex.   - STI labs ordered.    - HSV 1/2 Subtype by PCR (Lesion-Only); Future  - HSV 1/2 Subtype by PCR (Non-Lesion); Future  - HSV 1/2 Subtype by PCR (Lesion-Only)      4. Screen for sexually transmitted diseases  - T Pallidum Screening [place]; Future  - HIV AG AB Combo (Consent Obtained prechecked); Future  - Urine Chlamydia/GC Amplification; Future  - Hepatitis B Surface Antibody; Future  - Hepatitis B Surface Antigen; Future  - HCV Antibody; Future  - Urine Chlamydia/GC Amplification    5. Need for vaccination  - INFLUENZA VACCINE, TRI, PRESERV FREE, 0.5 ML    Follow-Up: Return if symptoms worsen or fail to improve.      Sabiha Madrigal, DO   10/24/24     CC: UTI (Burning when urinating. )    Subjective:    History of Present Illness:  History obtained from patient.     Jennyfer Rodgers is a 19 year old female who presents for UTI (Burning when urinating. )     DYSURIA=seen @[facility] 10/20/24 for burning upon urination and vulvar irritation and itching. UCx negative and vaginitis panel also negative. When pt swabbed for vaginitis panel had slight bleeding after- no reoccurrences.  Rx for Macrobid, but this didn't offer any relief.  Denies concern for STI.   Pain in the vulva.  Can be present regardless of if urinating.    -    Pshx: none   All: seasonal   Fam hx:VERONICA-M, MGM, Heart/CVA-PGF;Ca-MGM    Soc hx: Sexually active- monog.  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 (Menstrual status: Continuous Pill).. last pap: 2024, last mammogram: -,  ,   Colonoscopy: -         History/Other:   ROS-Per HPI     Problem List:  Patient Active Problem List   Diagnosis    Social anxiety disorder    Generalized anxiety  disorder    Pure hypercholesterolemia       Current Medications:  Current Outpatient Medications   Medication Sig Dispense Refill    valACYclovir (VALTREX) 1 G Oral Tab Take 1 tablet (1,000 mg total) by mouth every 12 (twelve) hours for 7 days. 14 tablet 0    buPROPion  MG Oral Tablet 24 Hr Take 1 tablet (150 mg total) by mouth daily. 90 tablet 0    NORTREL 1/35, 28, 1-35 MG-MCG Oral Tab TK 1 T PO QD ACTIVE PILLS CONTINUOUSLY. SKIP PLACEBO EACH MONTH.        Past Medical History:  Past Medical History:    Allergic rhinitis    Anxiety    Depression    i have gone through depression but am not currently depressed      Past Surgical History:  History reviewed. No pertinent surgical history.   Family History:  Family History   Problem Relation Age of Onset    No Known Problems Father     Anxiety Mother     Depression Mother     Anxiety Maternal Grandmother     Depression Maternal Grandmother     Cancer Maternal Grandfather     Thyroid disease Paternal Grandmother     Heart Disease Paternal Grandfather     Stroke Paternal Grandfather     No Known Problems Sister     Stroke Sister     High Blood Pressure Maternal Uncle       Social History:  Social History     Socioeconomic History    Marital status: Single   Tobacco Use    Smoking status: Never    Smokeless tobacco: Never   Vaping Use    Vaping status: Never Used   Substance and Sexual Activity    Alcohol use: Not Currently     Comment: 2x/month, 3-5 drinks of vodka per day.    Drug use: Yes     Frequency: 5.0 times per week     Types: Cannabis     Comment: 1-2x week, usually with friends, inhalation    Sexual activity: Never   Other Topics Concern    Caffeine Concern No    Exercise No    Seat Belt Yes    Special Diet No    Stress Concern Yes    Weight Concern Yes       Allergies:  Allergies   Allergen Reactions    Seasonal Coughing        Objective:    VITALS: /76   Pulse 94   Temp 97 °F (36.1 °C) (Temporal)   Resp 16   Ht 5' 6\" (1.676 m)   SpO2 98%    BMI 29.05 kg/m²      BP Readings from Last 3 Encounters:   10/24/24 102/76   10/20/24 120/70   08/06/24 110/86     Wt Readings from Last 3 Encounters:   10/20/24 180 lb (81.6 kg)   08/06/24 177 lb (80.3 kg)   06/25/24 188 lb (85.3 kg)     PHYSICAL EXAM  GEN: pleasant, well-appearing in NAD  SKIN: no visible rashes, lesions, or evidence of trauma  HEENT:  no conjunctivitis or scleral icterus; mmm  PULM: breathing comfortably on room air  MSK: moving all extremities well, no weakness or joint tenderness  NEURO: CNs grossly intact, no focal weakness, alert and oriented     PELVIC EXAM: Chaperone offered and declined.   Vulva: erythema, mild edema, and small ulcerations/vesicualr lesions .      Approximately 33 minutes was spent: preparing to see the patient (reviewing prior tests, office notes, and consultant notes), personally obtaining a history, conducting a physical exam, counseling the patient on the plan of care, entering appropriate orders, and documenting clinical information in the electronic health record.          Sabiha Madrigal, DO    NOTE TO PATIENT: The 21st Century Cures Act makes clinical notes like these available to patients in the interest of transparency. Clinical notes are medical documents used by physicians and care providers to communicate with each other. These documents include medical language and terminology, abbreviations, and treatment information that may sound technical and at times possibly unfamiliar. In addition, at times, the verbiage may appear blunt or direct. These documents are one tool providers use to communicate relevant information and clinical opinions of the care providers in a way that allows common understanding of the clinical context.

## 2024-10-25 ENCOUNTER — TELEPHONE (OUTPATIENT)
Dept: INTERNAL MEDICINE CLINIC | Facility: CLINIC | Age: 19
End: 2024-10-25

## 2024-10-25 DIAGNOSIS — R23.8 VESICULAR LESION: ICD-10-CM

## 2024-10-25 LAB
C TRACH DNA SPEC QL NAA+PROBE: NEGATIVE
N GONORRHOEA DNA SPEC QL NAA+PROBE: NEGATIVE

## 2024-10-25 RX ORDER — VALACYCLOVIR HYDROCHLORIDE 1 G/1
1000 TABLET, FILM COATED ORAL EVERY 12 HOURS SCHEDULED
Qty: 14 TABLET | Refills: 0 | Status: SHIPPED | OUTPATIENT
Start: 2024-10-25 | End: 2024-11-01

## 2024-10-25 NOTE — TELEPHONE ENCOUNTER
Patient was seen yesterday. Pharmacy is reaching out for clarification of sig for Valtrex. Original rx was   valACYclovir (VALTREX) 1 G Oral Tab 14 tablet 0 10/24/2024 10/31/2024    Sig - Route: Take 1 tablet (1,000 mg total) by mouth every 12 (twelve) hours for 7 days. 1 TAB Q12H x 3 days at first onset of cold sore symptoms      Per Dr Madrigal, it should be the 1000mg bid x 7d. Rx corrected and sent to pharmacy.

## 2024-10-28 ENCOUNTER — LABORATORY ENCOUNTER (OUTPATIENT)
Dept: LAB | Age: 19
End: 2024-10-28
Attending: FAMILY MEDICINE
Payer: COMMERCIAL

## 2024-10-28 DIAGNOSIS — Z11.3 SCREEN FOR SEXUALLY TRANSMITTED DISEASES: ICD-10-CM

## 2024-10-28 LAB
HBV SURFACE AB SER QL: REACTIVE
HBV SURFACE AB SERPL IA-ACNC: 78.68 MIU/ML
HBV SURFACE AG SER-ACNC: <0.1 [IU]/L
HBV SURFACE AG SERPL QL IA: NONREACTIVE
HCV AB SERPL QL IA: NONREACTIVE
T PALLIDUM AB SER QL IA: NONREACTIVE

## 2024-10-28 PROCEDURE — 86780 TREPONEMA PALLIDUM: CPT | Performed by: FAMILY MEDICINE

## 2024-10-28 PROCEDURE — 86803 HEPATITIS C AB TEST: CPT | Performed by: FAMILY MEDICINE

## 2024-10-28 PROCEDURE — 87340 HEPATITIS B SURFACE AG IA: CPT | Performed by: FAMILY MEDICINE

## 2024-10-28 PROCEDURE — 87389 HIV-1 AG W/HIV-1&-2 AB AG IA: CPT | Performed by: FAMILY MEDICINE

## 2024-10-28 PROCEDURE — 86706 HEP B SURFACE ANTIBODY: CPT | Performed by: FAMILY MEDICINE

## 2024-10-29 LAB
HSV 1 NAA: POSITIVE
HSV 1 NAA: POSITIVE
HSV 2 NAA: NEGATIVE
HSV 2 NAA: NEGATIVE

## 2024-11-20 DIAGNOSIS — F41.1 GENERALIZED ANXIETY DISORDER: ICD-10-CM

## 2024-11-25 RX ORDER — BUPROPION HYDROCHLORIDE 150 MG/1
150 TABLET ORAL DAILY
Qty: 90 TABLET | Refills: 3 | Status: SHIPPED | OUTPATIENT
Start: 2024-11-25

## 2024-11-25 NOTE — TELEPHONE ENCOUNTER
Refill passed per [facility] protocol.     Requested Prescriptions   Pending Prescriptions Disp Refills    BUPROPION  MG Oral Tablet 24 Hr [Pharmacy Med Name: BUPROPION HCL  MG TABLET] 90 tablet 0     Sig: TAKE 1 TABLET BY MOUTH EVERY DAY       Psychiatric Non-Scheduled (Anti-Anxiety) Passed - 11/25/2024 10:28 AM        Passed - In person appointment or virtual visit in the past 6 mos or appointment in next 3 mos     Recent Outpatient Visits              1 month ago Vulvar irritation    [facility], [address] [place] [address] Sabiha Fraser,     Office Visit    1 month ago Dysuria    [facility], Walk-In Clinic, [facility] Judy Chapman APRN    Office Visit    2 months ago UTI symptoms    [facility], Virtual Visit Alice Haley PA-C    E-Visit    3 months ago Pelvic pain    [facility], [address] [place] [address] Sabiha Fraser,     Office Visit    5 months ago Encounter for general adult medical examination with abnormal findings    [facility], [address] [place], [place] [address]Sabiha Michelle,     Office Visit                      Passed - Depression Screening completed within the past 12 months             @[facility]@      @[facility]@

## 2025-04-09 DIAGNOSIS — R23.8 VESICULAR LESION: ICD-10-CM

## 2025-04-11 RX ORDER — VALACYCLOVIR HYDROCHLORIDE 1 G/1
1000 TABLET, FILM COATED ORAL EVERY 12 HOURS
Qty: 14 TABLET | Refills: 0 | Status: SHIPPED | OUTPATIENT
Start: 2025-04-11

## 2025-04-11 NOTE — TELEPHONE ENCOUNTER
Spoke to patient and she states she started with a genital bump 2 days ago and now has a few, requesting Valacyclovir to get ahead start instead of a full break out. Denies rash, pain or drainage.    Dr. Madrigal - see pended refill request - 1st bump, genital, started 2 days ago

## 2025-04-11 NOTE — TELEPHONE ENCOUNTER
Patient called about refill on medication for    VALACYCLOVIR 1 G Oral Tab    Patient is out of medication

## 2025-06-04 DIAGNOSIS — R23.8 VESICULAR LESION: ICD-10-CM

## 2025-06-05 RX ORDER — VALACYCLOVIR HYDROCHLORIDE 1 G/1
1000 TABLET, FILM COATED ORAL EVERY 12 HOURS
Qty: 14 TABLET | Refills: 0 | Status: SHIPPED | OUTPATIENT
Start: 2025-06-05